# Patient Record
Sex: FEMALE | Race: WHITE | NOT HISPANIC OR LATINO | Employment: FULL TIME | URBAN - METROPOLITAN AREA
[De-identification: names, ages, dates, MRNs, and addresses within clinical notes are randomized per-mention and may not be internally consistent; named-entity substitution may affect disease eponyms.]

---

## 2018-08-31 ENCOUNTER — LAB VISIT (OUTPATIENT)
Dept: LAB | Facility: HOSPITAL | Age: 23
End: 2018-08-31
Attending: UROLOGY
Payer: COMMERCIAL

## 2018-08-31 ENCOUNTER — OFFICE VISIT (OUTPATIENT)
Dept: UROLOGY | Facility: CLINIC | Age: 23
End: 2018-08-31
Payer: COMMERCIAL

## 2018-08-31 VITALS
DIASTOLIC BLOOD PRESSURE: 81 MMHG | SYSTOLIC BLOOD PRESSURE: 125 MMHG | HEIGHT: 64 IN | HEART RATE: 76 BPM | WEIGHT: 102.75 LBS | BODY MASS INDEX: 17.54 KG/M2

## 2018-08-31 DIAGNOSIS — N89.8 VAGINAL DISCHARGE: ICD-10-CM

## 2018-08-31 DIAGNOSIS — R10.2 PELVIC PAIN: ICD-10-CM

## 2018-08-31 DIAGNOSIS — R31.0 GROSS HEMATURIA: Primary | ICD-10-CM

## 2018-08-31 DIAGNOSIS — R31.0 GROSS HEMATURIA: ICD-10-CM

## 2018-08-31 LAB
ALBUMIN SERPL BCP-MCNC: 4.6 G/DL
ALP SERPL-CCNC: 67 U/L
ALT SERPL W/O P-5'-P-CCNC: 11 U/L
ANION GAP SERPL CALC-SCNC: 10 MMOL/L
AST SERPL-CCNC: 15 U/L
B-HCG UR QL: NEGATIVE
BILIRUB SERPL-MCNC: 0.7 MG/DL
BILIRUB SERPL-MCNC: NORMAL MG/DL
BLOOD URINE, POC: NORMAL
BUN SERPL-MCNC: 11 MG/DL
CALCIUM SERPL-MCNC: 9.7 MG/DL
CANDIDA RRNA VAG QL PROBE: NEGATIVE
CHLORIDE SERPL-SCNC: 106 MMOL/L
CO2 SERPL-SCNC: 24 MMOL/L
COLOR, POC UA: NORMAL
CREAT SERPL-MCNC: 0.8 MG/DL
CTP QC/QA: YES
ERYTHROCYTE [DISTWIDTH] IN BLOOD BY AUTOMATED COUNT: 12.4 %
EST. GFR  (AFRICAN AMERICAN): >60 ML/MIN/1.73 M^2
EST. GFR  (NON AFRICAN AMERICAN): >60 ML/MIN/1.73 M^2
G VAGINALIS RRNA GENITAL QL PROBE: NEGATIVE
GLUCOSE SERPL-MCNC: 89 MG/DL
GLUCOSE UR QL STRIP: NORMAL
HCT VFR BLD AUTO: 39.5 %
HGB BLD-MCNC: 13 G/DL
KETONES UR QL STRIP: NORMAL
LEUKOCYTE ESTERASE URINE, POC: NORMAL
MCH RBC QN AUTO: 30.6 PG
MCHC RBC AUTO-ENTMCNC: 32.9 G/DL
MCV RBC AUTO: 93 FL
NITRITE, POC UA: NORMAL
PH, POC UA: 5
PLATELET # BLD AUTO: 239 K/UL
PMV BLD AUTO: 7.1 FL
POTASSIUM SERPL-SCNC: 3.6 MMOL/L
PROT SERPL-MCNC: 7.3 G/DL
PROTEIN, POC: NORMAL
RBC # BLD AUTO: 4.24 M/UL
SODIUM SERPL-SCNC: 140 MMOL/L
SPECIFIC GRAVITY, POC UA: 1.02
T VAGINALIS RRNA GENITAL QL PROBE: NEGATIVE
UROBILINOGEN, POC UA: NORMAL
WBC # BLD AUTO: 8.8 K/UL

## 2018-08-31 PROCEDURE — 81025 URINE PREGNANCY TEST: CPT | Mod: S$GLB,,, | Performed by: UROLOGY

## 2018-08-31 PROCEDURE — 85027 COMPLETE CBC AUTOMATED: CPT

## 2018-08-31 PROCEDURE — 80053 COMPREHEN METABOLIC PANEL: CPT

## 2018-08-31 PROCEDURE — 88112 CYTOPATH CELL ENHANCE TECH: CPT | Performed by: PATHOLOGY

## 2018-08-31 PROCEDURE — 36415 COLL VENOUS BLD VENIPUNCTURE: CPT

## 2018-08-31 PROCEDURE — 87660 TRICHOMONAS VAGIN DIR PROBE: CPT

## 2018-08-31 PROCEDURE — 81002 URINALYSIS NONAUTO W/O SCOPE: CPT | Mod: S$GLB,,, | Performed by: UROLOGY

## 2018-08-31 PROCEDURE — 99999 PR PBB SHADOW E&M-NEW PATIENT-LVL V: CPT | Mod: PBBFAC,,, | Performed by: UROLOGY

## 2018-08-31 PROCEDURE — 99204 OFFICE O/P NEW MOD 45 MIN: CPT | Mod: 25,S$GLB,, | Performed by: UROLOGY

## 2018-08-31 NOTE — H&P (VIEW-ONLY)
[facility] Note - [place]  Staff: MD Teo    Referring provider and please cc: self  PCP: none    [facility]: [facility]    Chief Complaint: gross hematuria    Subjective:        HPI: Josefa Shaver is a 23 y.o. female presents with     Gross hematuria and pain with urination.   -she says she had gross hematuria with clots 1 year ago and again this past month associated with urgency, urge incontinence, burning with urination and no fevers. The hematuria lasted for 4 days and has not had any bleeding since then but has pain persisted. She describes the pain as constant urgency and discomfort.  Definitely not associated with menses, sees gynecologist regularly and last saw her 6 months ago. No flank pain. She went to urgent care and unclear if they sent a culture. She's never had any imaging.   -she started having uti's about 2-3 years ago, sexually active but not associated with intercourse. Birth control was the only change. About 4x a year. Sx usually: dysuria, abdominal pain. No dyspareunia.   No personal or family hx of stones. No issues as a kid.   -today she feels like she has a uti bc it's comfortable when she voids. No vaginal discharge. ua today completely negative. Last abx dose was a month ago.     Of note she has PCOS and had exploratory laporoscopcy for cyst 2 years ago.     Urinalysis void: 1.020/5/remiander neg  Urine history:   Urgent care-[facility]    ECOG Status: 0      REVIEW OF SYSTEMS:  General ROS: no fevers, no chills  Psychological ROS: no depression  Endocrine ROS: no heat or cold  Respiratory ROS: no SOB  Cardiovascular ROS: no CP  Gastrointestinal ROS: no abdominal pain, no constipation, no diarrhea, noBRBPR  Musculoskeletal ROS: no muscle pain  Neurological ROS: no headaches  Dermatological ROS: no rashes  HEENT: noglasses, no sinus   ROS: per HPI     PMHx:  Past Medical History:   Diagnosis Date    Pelvic pain in female     PONV (postoperative nausea  and vomiting)    gross hematuria  Kidney stones: No    PSHx:  Past Surgical History:   Procedure Laterality Date    FRACTURE SURGERY Left 2004     Urologic or Gynecologic Surgery: exploratory laporoscopy    Stents/Valves/Foreign Bodies: No  Cardiac Evaluation: No  Gynecologist:      Screening Studies  Colonoscopy: none    Fam Hx:   malignancies: No , gyn malignancies: Grandmother with breast cancer . Parents alive   kidney stones: No     Soc Hx:  No tobacco.   No alcohol  Lives in [place]  :unmarried  Children: 0  Occupation:works at [employer]    Allergies:  Nalfon [fenoprofen]    Medications: reviewed   Anticoagulation: No    Objective:     Vitals:    08/31/18 0959   BP: 125/81   Pulse: 76       General:WDWN in NAD  Eyes: PERRLA, normal conjunctiva  Respiratory: no increased work on breathing. No wheezing.   Cardiovascular: No obvious extremity edema. Warm and well perfused.   GI: no palpation of masses. No tenderness. No hepatosplenomegaly to palpation.  Musculoskeletal: normal range of motion of bilateral upper extremities. Normal muscle strength and tone.  Skin: no obvious rashes or lesions. No tightening of skin noted.  Neurologic: CN grossly normal. Normal sensation.   Psychiatric: awake, alert and oriented x 3. Mood and affect normal. Cooperative.    Pelvic exam  External Genitalia: normal hair distribution, no lesions  Urethral meatus: normal without prolapse or caruncle  Urethra: without tenderness or mass  Bladder: without fulness or tenderness  Vagina: normal appearing. No discharge or lesions. No  prolapse. +vaginal discharge white sent for affirm  Anus and perineum: appear normal  No abnormal anterior urethra suggesting diverticulm, no discharge with palapation  Levator ani tenderness: none    LABS REVIEW:      Lab Results   Component Value Date    WBC 8.35 10/10/2016    HGB 13.8 10/10/2016    HCT 41.9 10/10/2016    MCV 95 10/10/2016     10/10/2016     BMP  No results  found for: NA, K, CL, CO2, BUN, CREATININE, CALCIUM, ANIONGAP, ESTGFRAFRICA, EGFRNONAA      PATHOLOGY REVIEW:  none    RADIOGRAPHIC REVIEW:  none      Assessment:       1. Gross hematuria    2. Pelvic pain    3. Vaginal discharge          Plan:     This pt had pelvic pain associated with gross hematuria that lasted four days. She showed me a picture and she had bright red urine with clots. This is not a normal presentation of UTI. That being said she has no blood in urine today but still has pain and urgency. I do not think this is vaginal as she denies abnormal periods and it was only associated with voiding.  I suspect she has a stone, however I would like to proceed with a CTU instead of a CTRSS bc of the hematuria. She has no baseline labs and CBC/CMP. I am also sending a urine cytology, although she is low risk, bc of this abnormal presentation.     I also sent a vaginosis screen but do not think this is the cause. Vaginal exam did not reveal any obvious urethral diverticulum. That being said I have her scheduled for a cystoscopy as well to closely evaluate for this.     ctu  This week or next  cmp and cbc  Cystoscopy on sept 24  urinlaysis and culture in lab 1 week prior  Vaginosis screen today- take off speculum   Urine cytology today     Beckie Bruce MD

## 2018-08-31 NOTE — PATIENT INSTRUCTIONS
ctu  This week or next  cmp and cbc today   Cystoscopy on sept 24  urinlaysis and culture in lab 1 week prior  Vaginosis screen today- take off speculum   Urine cytology today

## 2018-08-31 NOTE — PROGRESS NOTES
[facility] - [place]  Staff: MD Teo    Referring provider and please cc: self  PCP: none    [facility]: inactive    Chief Complaint: gross hematuria    Subjective:        HPI: Josefa Shaver is a 23 y.o. female presents with     Gross hematuria and pain with urination.   -she says she had gross hematuria with clots 1 year ago and again this past month associated with urgency, urge incontinence, burning with urination and no fevers. The hematuria lasted for 4 days and has not had any bleeding since then but has pain persisted. She describes the pain as constant urgency and discomfort.  Definitely not associated with menses, sees gynecologist regularly and last saw her 6 months ago. No flank pain. She went to urgent care and unclear if they sent a culture. She's never had any imaging.   -she started having uti's about 2-3 years ago, sexually active but not associated with intercourse. Birth control was the only change. About 4x a year. Sx usually: dysuria, abdominal pain. No dyspareunia.   No personal or family hx of stones. No issues as a kid.   -today she feels like she has a uti bc it's comfortable when she voids. No vaginal discharge. ua today completely negative. Last abx dose was a month ago.     Of note she has PCOS and had exploratory laporoscopcy for cyst 2 years ago.     Urinalysis void: 1.020/5/remiander neg  Urine history:   Urgent care-[facility]    ECOG Status: 0      REVIEW OF SYSTEMS:  General ROS: no fevers, no chills  Psychological ROS: no depression  Endocrine ROS: no heat or cold  Respiratory ROS: no SOB  Cardiovascular ROS: no CP  Gastrointestinal ROS: no abdominal pain, no constipation, no diarrhea, noBRBPR  Musculoskeletal ROS: no muscle pain  Neurological ROS: no headaches  Dermatological ROS: no rashes  HEENT: noglasses, no sinus   ROS: per HPI     PMHx:  Past Medical History:   Diagnosis Date    Pelvic pain in female     PONV (postoperative nausea  and vomiting)    gross hematuria  Kidney stones: No    PSHx:  Past Surgical History:   Procedure Laterality Date    FRACTURE SURGERY Left 2004     Urologic or Gynecologic Surgery: exploratory laporoscopy    Stents/Valves/Foreign Bodies: No  Cardiac Evaluation: No  Gynecologist:      Screening Studies  Colonoscopy: none    Fam Hx:   malignancies: No , gyn malignancies: Grandmother with breast cancer . Parents alive   kidney stones: No     Soc Hx:  No tobacco.   No alcohol  Lives in [place]  :unmarried  Children: 0  Occupation:works at [employer]    Allergies:  Nalfon [fenoprofen]    Medications: reviewed   Anticoagulation: No    Objective:     Vitals:    08/31/18 0959   BP: 125/81   Pulse: 76       General:WDWN in NAD  Eyes: PERRLA, normal conjunctiva  Respiratory: no increased work on breathing. No wheezing.   Cardiovascular: No obvious extremity edema. Warm and well perfused.   GI: no palpation of masses. No tenderness. No hepatosplenomegaly to palpation.  Musculoskeletal: normal range of motion of bilateral upper extremities. Normal muscle strength and tone.  Skin: no obvious rashes or lesions. No tightening of skin noted.  Neurologic: CN grossly normal. Normal sensation.   Psychiatric: awake, alert and oriented x 3. Mood and affect normal. Cooperative.    Pelvic exam  External Genitalia: normal hair distribution, no lesions  Urethral meatus: normal without prolapse or caruncle  Urethra: without tenderness or mass  Bladder: without fulness or tenderness  Vagina: normal appearing. No discharge or lesions. No  prolapse. +vaginal discharge white sent for affirm  Anus and perineum: appear normal  No abnormal anterior urethra suggesting diverticulm, no discharge with palapation  Levator ani tenderness: none    LABS REVIEW:      Lab Results   Component Value Date    WBC 8.35 10/10/2016    HGB 13.8 10/10/2016    HCT 41.9 10/10/2016    MCV 95 10/10/2016     10/10/2016     BMP  No results  found for: NA, K, CL, CO2, BUN, CREATININE, CALCIUM, ANIONGAP, ESTGFRAFRICA, EGFRNONAA      PATHOLOGY REVIEW:  none    RADIOGRAPHIC REVIEW:  none      Assessment:       1. Gross hematuria    2. Pelvic pain    3. Vaginal discharge          Plan:     This pt had pelvic pain associated with gross hematuria that lasted four days. She showed me a picture and she had bright red urine with clots. This is not a normal presentation of UTI. That being said she has no blood in urine today but still has pain and urgency. I do not think this is vaginal as she denies abnormal periods and it was only associated with voiding.  I suspect she has a stone, however I would like to proceed with a CTU instead of a CTRSS bc of the hematuria. She has no baseline labs and CBC/CMP. I am also sending a urine cytology, although she is low risk, bc of this abnormal presentation.     I also sent a vaginosis screen but do not think this is the cause. Vaginal exam did not reveal any obvious urethral diverticulum. That being said I have her scheduled for a cystoscopy as well to closely evaluate for this.     ctu  This week or next  cmp and cbc  Cystoscopy on sept 24  urinlaysis and culture in lab 1 week prior  Vaginosis screen today- take off speculum   Urine cytology today     Beckie Bruce MD

## 2018-09-04 ENCOUNTER — TELEPHONE (OUTPATIENT)
Dept: UROLOGY | Facility: CLINIC | Age: 23
End: 2018-09-04

## 2018-09-04 ENCOUNTER — HOSPITAL ENCOUNTER (OUTPATIENT)
Dept: RADIOLOGY | Facility: HOSPITAL | Age: 23
Discharge: HOME | End: 2018-09-04
Attending: UROLOGY
Payer: COMMERCIAL

## 2018-09-04 DIAGNOSIS — R31.0 GROSS HEMATURIA: ICD-10-CM

## 2018-09-04 PROCEDURE — 74178 CT ABD&PLV WO CNTR FLWD CNTR: CPT | Mod: TC

## 2018-09-04 PROCEDURE — 74178 CT ABD&PLV WO CNTR FLWD CNTR: CPT | Mod: 26,,, | Performed by: RADIOLOGY

## 2018-09-04 PROCEDURE — 25500020 PHARM REV CODE 255

## 2018-09-04 RX ADMIN — IOHEXOL 75 ML: 350 INJECTION, SOLUTION INTRAVENOUS at 09:09

## 2018-09-10 ENCOUNTER — TELEPHONE (OUTPATIENT)
Dept: UROLOGY | Facility: CLINIC | Age: 23
End: 2018-09-10

## 2018-09-10 NOTE — TELEPHONE ENCOUNTER
----- Message from Maria Luisa Jacobsen sent at 9/10/2018  2:50 PM CDT -----  Contact: self  Type:  Patient Returning Call    Who Called:  self  Who Left Message for Patient:  Stefanie  Does the patient know what this is regarding?:  no  Best Call Back Number:  [phone]  Additional Information:  Returning call. Thanks!

## 2018-09-17 ENCOUNTER — LAB VISIT (OUTPATIENT)
Dept: LAB | Facility: HOSPITAL | Age: 23
End: 2018-09-17
Attending: UROLOGY
Payer: COMMERCIAL

## 2018-09-17 DIAGNOSIS — R10.2 PELVIC PAIN: ICD-10-CM

## 2018-09-17 DIAGNOSIS — R31.0 GROSS HEMATURIA: ICD-10-CM

## 2018-09-17 LAB
BILIRUB UR QL STRIP: NEGATIVE
CLARITY UR: CLEAR
COLOR UR: YELLOW
GLUCOSE UR QL STRIP: NEGATIVE
HGB UR QL STRIP: NEGATIVE
KETONES UR QL STRIP: NEGATIVE
LEUKOCYTE ESTERASE UR QL STRIP: NEGATIVE
NITRITE UR QL STRIP: NEGATIVE
PH UR STRIP: 6 [PH] (ref 5–8)
PROT UR QL STRIP: NEGATIVE
SP GR UR STRIP: >=1.03 (ref 1–1.03)
URN SPEC COLLECT METH UR: ABNORMAL
UROBILINOGEN UR STRIP-ACNC: NEGATIVE EU/DL

## 2018-09-17 PROCEDURE — 81003 URINALYSIS AUTO W/O SCOPE: CPT

## 2018-09-17 PROCEDURE — 87086 URINE CULTURE/COLONY COUNT: CPT

## 2018-09-19 LAB
BACTERIA UR CULT: NORMAL
BACTERIA UR CULT: NORMAL

## 2018-09-24 ENCOUNTER — HOSPITAL ENCOUNTER (OUTPATIENT)
Facility: AMBULARY SURGERY CENTER | Age: 23
Discharge: HOME | End: 2018-09-24
Attending: UROLOGY | Admitting: UROLOGY
Payer: COMMERCIAL

## 2018-09-24 DIAGNOSIS — R31.0 GROSS HEMATURIA: ICD-10-CM

## 2018-09-24 DIAGNOSIS — R10.2 FEMALE PELVIC PAIN: Primary | ICD-10-CM

## 2018-09-24 LAB
BACTERIA SPEC CULT: NORMAL
BILIRUB SERPL-MCNC: NORMAL MG/DL
BILIRUB SERPL-MCNC: NORMAL MG/DL
BLOOD URINE, POC: NORMAL
BLOOD URINE, POC: NORMAL
CASTS: NORMAL
COLOR, POC UA: NORMAL
COLOR, POC UA: NORMAL
CRYSTALS: NORMAL
GLUCOSE UR QL STRIP: NORMAL
GLUCOSE UR QL STRIP: NORMAL
KETONES UR QL STRIP: NORMAL
KETONES UR QL STRIP: NORMAL
LEUKOCYTE ESTERASE URINE, POC: NORMAL
LEUKOCYTE ESTERASE URINE, POC: NORMAL
NITRITE, POC UA: NORMAL
NITRITE, POC UA: NORMAL
PH, POC UA: 6
PH, POC UA: 7
PROTEIN, POC: 100
PROTEIN, POC: NORMAL
RBC CELLS COUNTED: NORMAL
SPECIFIC GRAVITY, POC UA: 1.01
SPECIFIC GRAVITY, POC UA: NORMAL
UROBILINOGEN, POC UA: NORMAL
UROBILINOGEN, POC UA: NORMAL
WHITE BLOOD CELLS: NORMAL

## 2018-09-24 PROCEDURE — 57452 EXAM OF CERVIX W/SCOPE: CPT

## 2018-09-24 PROCEDURE — 57452 EXAM OF CERVIX W/SCOPE: CPT | Mod: 51,,, | Performed by: UROLOGY

## 2018-09-24 PROCEDURE — 52000 CYSTOURETHROSCOPY: CPT | Performed by: UROLOGY

## 2018-09-24 PROCEDURE — 52000 CYSTOURETHROSCOPY: CPT | Mod: ,,, | Performed by: UROLOGY

## 2018-09-24 RX ORDER — LIDOCAINE HYDROCHLORIDE 20 MG/ML
JELLY TOPICAL
Status: DISCONTINUED | OUTPATIENT
Start: 2018-09-24 | End: 2018-09-24 | Stop reason: HOSPADM

## 2018-09-24 RX ORDER — LIDOCAINE HYDROCHLORIDE 20 MG/ML
JELLY TOPICAL
Status: DISCONTINUED
Start: 2018-09-24 | End: 2018-09-24 | Stop reason: HOSPADM

## 2018-09-24 RX ORDER — CIPROFLOXACIN 500 MG/1
TABLET ORAL
Status: DISCONTINUED
Start: 2018-09-24 | End: 2018-09-24 | Stop reason: HOSPADM

## 2018-09-24 RX ORDER — WATER 1000 ML/1000ML
INJECTION, SOLUTION INTRAVENOUS
Status: DISCONTINUED | OUTPATIENT
Start: 2018-09-24 | End: 2018-09-24 | Stop reason: HOSPADM

## 2018-09-24 RX ORDER — CIPROFLOXACIN 500 MG/1
500 TABLET ORAL EVERY 12 HOURS
Status: DISCONTINUED | OUTPATIENT
Start: 2018-09-24 | End: 2018-09-24 | Stop reason: HOSPADM

## 2018-09-24 RX ADMIN — CIPROFLOXACIN 500 MG: 500 TABLET ORAL at 03:09

## 2018-09-24 NOTE — OP NOTE
[facility] Procedure Note  Date: 09/24/2018    Procedure:   1. Flexible cysto-uretheroscopy   2. Vaginoscopy with cystoscope    Pre Procedure Diagnosis: gross hematuria, pelvic pain    Post Procedure Diagnosis: same, see below for findings    Surgeon: Beckie Bruce MD    Specimen: none    Anesthesia: 2% uro-jet lidocaine jelly for local analgesia    Indications: Josefa Shaver is a 23 y.o. female  With gross heamturia and pelvic pain. Urine reviewed and cath ua was negative.  H&P reviewed.     Procedure in detail:   Flexible cysto-urethroscopy was performed after consent was obtained.  The risks and benefits were explained.    2% lidocaine urojet was used for local analgesia.  The genitalia was prepped and draped in the sterile fashion with betadine.    The flexible scope was advanced into the urethra and into the bladder.  Bilateral ureteral orifice were evaluated and noted to be normal with clear efflux.  The bladder was completely surveyed in a systematic fashion and the scope was retroflexed.    Cystoscopy findings as below in findings.   No strictures were noted.     Vaginoscopy performed with cystoscope.     The patient tolerated the procedure well without complication.    Findings: (pictures were  uploaded into media)  1. Cystoscopy findings:  She did seem to be having a detrussor contraction with voiding but eventually relaxed. .  2. Vaginal exam findings: no tenderness on her urethra. She says the pain is deep to her clitoris and in her lower abdomen when she has to void. Non tender on her clitoris. Red labia majora but also gets waxed.   3. Other findings: vaginoscopy- showed IUD string. White discharge. Previously sent and vaginosis screen was negative.       Plan:    Follow up with urology NP in 3 months and if no improvement with oab med will refer for pelvic floor physical therapy. Pre-op nurse mentioned bruising on inner thigh? But I did not see. She has only be having symptoms of  pelvic pain for 2 months but does state she ahd gross hematuria 2 years ago. Could be dysfunctional voiding? Ct scan negative. Cystoscopy showed detrusor contraction. +levator tendereness. F/u wth me in 6 months if no improvement in symptoms.   Still if no improvement may need MRI to evaluate for any neurologic conditions like MS.     Take myrbetriq 25mg daily for leakage. Need to take for 4 to 6 weeks to see results  Call us if too expensive. Will try for 3 months and then plan to discontinue even if doing well to see if it helps retrain her bladder. More than likely will need pelvic floor pt.    If she goes to ER for uti or hematuria she needs catheterized urines sent for culture and a vaginal exam done at the time to ensure no vaginal bleeding. If its during weekday she can see if she can f/u with urology.    Her cath urine today was negative and voided +. prevously culture + and ua neg. She needs catheterized urine samples.   If no improvement in vaginal/clitoral pain then see gynecologist   Apply small dab of lidocaine jelly to exterior area that is tender up to twice a day, wash hands before and after     Beckie Bruce MD

## 2018-09-24 NOTE — DISCHARGE SUMMARY
[facility]  Urology  Discharge Note - Short Stay      Patient Name: Josefa Shaver  MRN: [id number]  Discharge Date and Time:  09/24/2018 3:52 PM  Attending Physician: Beckie Bruce,*   Discharging Provider: Beckie Bruce MD  Primary Care Physician: Primary Doctor No    Final Active Diagnoses:    Diagnosis Date Noted POA    Gross hematuria [R31.0] 09/24/2018 Yes      Problems Resolved During this Admission:       Final Diagnoses: Same as principal problem.    Hospital Course: Patient was admitted for an outpatient procedure and tolerated the procedure well with no complications.*    Procedure(s) (LRB):  CYSTOSCOPY (N/A)  VAGINOSCOPY     Indwelling Lines/Drains at time of discharge:   Lines/Drains/Airways          None          Discharged Condition: good    Disposition: home    Follow Up:      Patient Instructions:   No discharge procedures on file.    Medications:  Reconciled Home Medications:      Medication List      START taking these medications    mirabegron 25 mg Tb24 ER tablet  Commonly known as:  MYRBETRIQ  Take 1 tablet (25 mg total) by mouth once daily.        CONTINUE taking these medications    ibuprofen 800 MG tablet  Commonly known as:  ADVIL,MOTRIN  Take 1 tablet (800 mg total) by mouth 3 (three) times daily.            Discharge Procedure Orders (must include Diet, Follow-up, Activity):  No discharge procedures on file.         Beckie Bruce MD  Urology  [facility]

## 2018-09-24 NOTE — DISCHARGE INSTRUCTIONS
Follow up with urology NP in 3 months and if no improvement with oab med will refer for pelvic floor physical therapy and stil if no improvement may need MRI to evaluate for any neurologic conditions like MS.   Take myrbetriq 25mg daily for leakage. Need to take for 4 to 6 weeks to see results  Call us if too expensive  If she goes to ER for uti or hematuria she needs catheterized urines sent for culture and a vaginal exam done at the time to ensure no vaginal bleeding. If its during weekday she can see if she can f/u with urology  If no improvement in vaginal/clitoral pain then see gynecologist   Apply small dab of lidocaine jelly to exterior area that is tender up to twice a day, wash hands before and after     Cystoscopy    Cystoscopy is a procedure that lets your doctor look directly inside your urethra and bladder. It can be used to:  · Help diagnose a problem with your urethra, bladder, or kidneys.  · Take a sample (biopsy) of bladder or urethral tissue.  · Treat certain problems (such as removing kidney stones).  · Place a stent to bypass an obstruction.  · Take special X-rays of the kidneys.  Based on the findings, your doctor may recommend other tests or treatments.  What is a cystoscope?  A cystoscope is a telescope-like instrument that contains lenses and fiberoptics (small glass wires that make bright light). The cystoscope may be straight and rigid, or flexible to bend around curves in the urethra. The doctor may look directly into the cystoscope, or project the image onto a monitor.  Getting ready  · Ask your doctor if you should stop taking any medicines before the procedure.  · Ask whether you should avoid eating or drinking anything after midnight before the procedure.  · Follow any other instructions your doctor gives you.  Tell your doctor before the exam if you:  · Take any medicines, such as aspirin or blood thinners  · Have allergies to any medicines  · Are pregnant   The  procedure  Cystoscopy is done in the doctors office, surgery center, or hospital. The doctor and a nurse are present during the procedure. It takes only a few minutes, longer if a biopsy, X-ray, or treatment needs to be done.  During the procedure:  · You lie on an exam table on your back, knees bent and legs apart. You are covered with a drape.  · Your urethra and the area around it are washed. Anesthetic jelly may be applied to numb the urethra. Other pain medicine is usually not needed. In some cases, you may be offered a mild sedative to help you relax. If a more extensive procedure is to be done, such as a biopsy or kidney stone removal, general anesthesia may be needed.  · The cystoscope is inserted. A sterile fluid is put into the bladder to expand it. You may feel pressure from this fluid.  · When the procedure is done, the cystoscope is removed.  After the procedure  If you had a sedative, general anesthesia, or spinal anesthesia, you must have someone drive you home. Once youre home:  · Drink plenty of fluids.  · You may have burning or light bleeding when you urinate--this is normal.  · Medicines may be prescribed to ease any discomfort or prevent infection. Take these as directed.  · Call your doctor if you have heavy bleeding or blood clots, burning that lasts more than a day, a fever over 100°F  (38° C), or trouble urinating.  Date Last Reviewed: 1/1/2017  © 7331-5570 [employer]. [address] [place] [address], [place] [address]. All rights reserved. This information is not intended as a substitute for professional medical care. Always follow your healthcare professional's instructions.

## 2018-09-24 NOTE — INTERVAL H&P NOTE
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  No stones in ureters  Ureters opacify proximally without any filling defects  Bladder normal  iud in uterus    URINE, VOIDED 8/318/18:  -Negative; many benign urothelial cells and benign squamous cells.  -No dysplastic or malignant cells are present.    Urinalysis void: tr leuk, cath:   Urine history:   9/17/18 Multiple org, void: neg  8/31/18 No cx, void: neg, vaginosis screen negative  Urgent care-[facility]    Anti-coagulation: none        Lab Results   Component Value Date    WBC 8.80 08/31/2018    HGB 13.0 08/31/2018    HCT 39.5 08/31/2018    MCV 93 08/31/2018     08/31/2018     BMP  Lab Results   Component Value Date     08/31/2018    K 3.6 08/31/2018     08/31/2018    CO2 24 08/31/2018    BUN 11 08/31/2018    CREATININE 0.8 08/31/2018    CALCIUM 9.7 08/31/2018    ANIONGAP 10 08/31/2018    ESTGFRAFRICA >60 08/31/2018    EGFRNONAA >60 08/31/2018         This patient has been cleared for surgery in ambulatory surgical facility.

## 2018-09-25 VITALS
SYSTOLIC BLOOD PRESSURE: 146 MMHG | OXYGEN SATURATION: 100 % | TEMPERATURE: 99 F | DIASTOLIC BLOOD PRESSURE: 89 MMHG | HEART RATE: 96 BPM | BODY MASS INDEX: 17.54 KG/M2 | WEIGHT: 102.75 LBS | HEIGHT: 64 IN | RESPIRATION RATE: 18 BRPM

## 2022-01-10 ENCOUNTER — HOSPITAL ENCOUNTER (EMERGENCY)
Facility: HOSPITAL | Age: 27
Discharge: LEFT BEFORE DISCHARGE | End: 2022-01-10
Attending: EMERGENCY MEDICINE
Payer: COMMERCIAL

## 2022-01-10 VITALS
HEART RATE: 68 BPM | WEIGHT: 112 LBS | OXYGEN SATURATION: 99 % | SYSTOLIC BLOOD PRESSURE: 158 MMHG | HEIGHT: 64 IN | DIASTOLIC BLOOD PRESSURE: 88 MMHG | BODY MASS INDEX: 19.12 KG/M2 | TEMPERATURE: 98 F | RESPIRATION RATE: 18 BRPM

## 2022-01-10 DIAGNOSIS — Z53.21 ELOPED FROM EMERGENCY DEPARTMENT: Primary | ICD-10-CM

## 2022-01-10 DIAGNOSIS — U07.1 COVID-19: ICD-10-CM

## 2022-01-10 DIAGNOSIS — R10.9 FLANK PAIN: ICD-10-CM

## 2022-01-10 LAB
ALBUMIN SERPL BCP-MCNC: 4.6 G/DL (ref 3.5–5.2)
ALP SERPL-CCNC: 65 U/L (ref 55–135)
ALT SERPL W/O P-5'-P-CCNC: 15 U/L (ref 10–44)
ANION GAP SERPL CALC-SCNC: 13 MMOL/L (ref 8–16)
AST SERPL-CCNC: 19 U/L (ref 10–40)
B-HCG UR QL: NEGATIVE
BASOPHILS # BLD AUTO: 0.01 K/UL (ref 0–0.2)
BASOPHILS NFR BLD: 0.1 % (ref 0–1.9)
BILIRUB SERPL-MCNC: 0.6 MG/DL (ref 0.1–1)
BILIRUB UR QL STRIP: NEGATIVE
BUN SERPL-MCNC: 12 MG/DL (ref 6–20)
CALCIUM SERPL-MCNC: 9.5 MG/DL (ref 8.7–10.5)
CHLORIDE SERPL-SCNC: 101 MMOL/L (ref 95–110)
CK SERPL-CCNC: 51 U/L (ref 20–180)
CLARITY UR: CLEAR
CO2 SERPL-SCNC: 24 MMOL/L (ref 23–29)
COLOR UR: YELLOW
CREAT SERPL-MCNC: 0.8 MG/DL (ref 0.5–1.4)
CRP SERPL-MCNC: 0.23 MG/DL
CTP QC/QA: YES
D DIMER PPP IA.FEU-MCNC: <0.27 UG/ML FEU
DIFFERENTIAL METHOD: NORMAL
EOSINOPHIL # BLD AUTO: 0 K/UL (ref 0–0.5)
EOSINOPHIL NFR BLD: 0.4 % (ref 0–8)
ERYTHROCYTE [DISTWIDTH] IN BLOOD BY AUTOMATED COUNT: 11.9 % (ref 11.5–14.5)
EST. GFR  (AFRICAN AMERICAN): >60 ML/MIN/1.73 M^2
EST. GFR  (NON AFRICAN AMERICAN): >60 ML/MIN/1.73 M^2
FERRITIN SERPL-MCNC: 124 NG/ML (ref 20–300)
GLUCOSE SERPL-MCNC: 114 MG/DL (ref 70–110)
GLUCOSE UR QL STRIP: NEGATIVE
HCT VFR BLD AUTO: 40.3 % (ref 37–48.5)
HGB BLD-MCNC: 13.5 G/DL (ref 12–16)
HGB UR QL STRIP: NEGATIVE
IMM GRANULOCYTES # BLD AUTO: 0.03 K/UL (ref 0–0.04)
IMM GRANULOCYTES NFR BLD AUTO: 0.3 % (ref 0–0.5)
KETONES UR QL STRIP: NEGATIVE
LACTATE SERPL-SCNC: 1.2 MMOL/L (ref 0.5–1.9)
LDH SERPL L TO P-CCNC: 127 U/L (ref 110–260)
LEUKOCYTE ESTERASE UR QL STRIP: NEGATIVE
LYMPHOCYTES # BLD AUTO: 2.2 K/UL (ref 1–4.8)
LYMPHOCYTES NFR BLD: 23.9 % (ref 18–48)
MCH RBC QN AUTO: 30.6 PG (ref 27–31)
MCHC RBC AUTO-ENTMCNC: 33.5 G/DL (ref 32–36)
MCV RBC AUTO: 91 FL (ref 82–98)
MONOCYTES # BLD AUTO: 0.6 K/UL (ref 0.3–1)
MONOCYTES NFR BLD: 6.9 % (ref 4–15)
NEUTROPHILS # BLD AUTO: 6.3 K/UL (ref 1.8–7.7)
NEUTROPHILS NFR BLD: 68.4 % (ref 38–73)
NITRITE UR QL STRIP: NEGATIVE
NRBC BLD-RTO: 0 /100 WBC
PH UR STRIP: 6 [PH] (ref 5–8)
PLATELET # BLD AUTO: 252 K/UL (ref 150–450)
PMV BLD AUTO: 9.3 FL (ref 9.2–12.9)
POTASSIUM SERPL-SCNC: 3.5 MMOL/L (ref 3.5–5.1)
PROCALCITONIN SERPL IA-MCNC: <0.05 NG/ML (ref 0–0.5)
PROT SERPL-MCNC: 7.7 G/DL (ref 6–8.4)
PROT UR QL STRIP: ABNORMAL
RBC # BLD AUTO: 4.41 M/UL (ref 4–5.4)
SODIUM SERPL-SCNC: 138 MMOL/L (ref 136–145)
SP GR UR STRIP: 1.03 (ref 1–1.03)
TROPONIN I SERPL DL<=0.01 NG/ML-MCNC: <0.03 NG/ML
URN SPEC COLLECT METH UR: ABNORMAL
UROBILINOGEN UR STRIP-ACNC: NEGATIVE EU/DL
WBC # BLD AUTO: 9.28 K/UL (ref 3.9–12.7)

## 2022-01-10 PROCEDURE — 83605 ASSAY OF LACTIC ACID: CPT | Performed by: EMERGENCY MEDICINE

## 2022-01-10 PROCEDURE — 85379 FIBRIN DEGRADATION QUANT: CPT | Performed by: EMERGENCY MEDICINE

## 2022-01-10 PROCEDURE — 84484 ASSAY OF TROPONIN QUANT: CPT | Performed by: EMERGENCY MEDICINE

## 2022-01-10 PROCEDURE — 81025 URINE PREGNANCY TEST: CPT | Performed by: EMERGENCY MEDICINE

## 2022-01-10 PROCEDURE — 85025 COMPLETE CBC W/AUTO DIFF WBC: CPT | Performed by: EMERGENCY MEDICINE

## 2022-01-10 PROCEDURE — 82550 ASSAY OF CK (CPK): CPT | Performed by: EMERGENCY MEDICINE

## 2022-01-10 PROCEDURE — 80053 COMPREHEN METABOLIC PANEL: CPT | Performed by: EMERGENCY MEDICINE

## 2022-01-10 PROCEDURE — 82728 ASSAY OF FERRITIN: CPT | Performed by: EMERGENCY MEDICINE

## 2022-01-10 PROCEDURE — 83615 LACTATE (LD) (LDH) ENZYME: CPT | Performed by: EMERGENCY MEDICINE

## 2022-01-10 PROCEDURE — 84145 PROCALCITONIN (PCT): CPT | Performed by: EMERGENCY MEDICINE

## 2022-01-10 PROCEDURE — 81003 URINALYSIS AUTO W/O SCOPE: CPT | Performed by: EMERGENCY MEDICINE

## 2022-01-10 PROCEDURE — 99284 EMERGENCY DEPT VISIT MOD MDM: CPT | Mod: 25

## 2022-01-10 PROCEDURE — 86140 C-REACTIVE PROTEIN: CPT | Performed by: EMERGENCY MEDICINE

## 2022-01-11 NOTE — ED PROVIDER NOTES
Encounter Date: 1/10/2022       History     Chief Complaint   Patient presents with    Flank Pain     R sided flank pain since 1600 today, burning with urination      26-year-old well-appearing female presents emergency department reports that she tested positive for COVID on January 4th.  She states that she has had no symptoms since January 5th she reports approximately 4 days ago she started with some mild dysuria, frequency and noted some blood in her urine.  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  Patient also reports that her back hurts with any type of movement, or bending.  She denies associated chest pain or shortness of breath she denies pleuritic pain however she does take birth control.        Review of patient's allergies indicates:   Allergen Reactions    Nalfon [fenoprofen] Other (See Comments)     Blurred vision     Past Medical History:   Diagnosis Date    Pelvic pain in female     PONV (postoperative nausea and vomiting)      Past Surgical History:   Procedure Laterality Date    CYSTOSCOPY N/A 9/24/2018    Procedure: CYSTOSCOPY;  Surgeon: Beckie Bruce MD;  Location: [facility] to [facility] OR;  Service: Urology;  Laterality: N/A;    FRACTURE SURGERY Left 2004    VAGINOSCOPY  9/24/2018    Procedure: VAGINOSCOPY;  Surgeon: Beckie Bruce MD;  Location: [facility] to [facility] OR;  Service: Urology;;     Family History   Problem Relation Age of Onset    Diabetes Mother     Hypertension Mother     Depression Mother     Transient ischemic attack Mother     Hyperlipidemia Father      Social History     Tobacco Use    Smoking status: Never Smoker   Substance Use Topics    Alcohol use: No    Drug use: No     Review of Systems   Constitutional: Positive for chills and fever.   HENT: Negative.     Respiratory: Negative.    Cardiovascular: Negative.    Gastrointestinal: Negative.    Genitourinary: Positive for dysuria, flank pain and frequency.   Musculoskeletal: Positive for back pain.   Skin: Negative.    Neurological: Negative.    Hematological: Negative.    Psychiatric/Behavioral: Negative.        Physical Exam     Initial Vitals [01/10/22 1800]   BP Pulse Resp Temp SpO2   (!) 158/88 68 18 97.7 °F (36.5 °C) 99 %      MAP       --         Physical Exam    Nursing note and vitals reviewed.  Constitutional: She appears well-developed and well-nourished.   HENT:   Head: Normocephalic and atraumatic.   Eyes: EOM are normal. Pupils are equal, round, and reactive to light.   Cardiovascular: Normal rate, regular rhythm and normal heart sounds.   Pulmonary/Chest: Breath sounds normal. No respiratory distress. She has no wheezes. She exhibits no tenderness.   Abdominal: Abdomen is soft. Bowel sounds are normal.   Musculoskeletal:         General: Normal range of motion.     Neurological: She is alert and oriented to person, place, and time.   Skin: Skin is dry. No rash noted.   Psychiatric: She has a normal mood and affect.         ED Course   Procedures  Labs Reviewed   COMPREHENSIVE METABOLIC PANEL - Abnormal; Notable for the following components:       Result Value    Glucose 114 (*)     All other components within normal limits   URINALYSIS, REFLEX TO URINE CULTURE - Abnormal; Notable for the following components:    Protein, UA Trace (*)     All other components within normal limits    Narrative:     Specimen Source->Urine   POCT URINE PREGNANCY - Normal   CBC W/ AUTO DIFFERENTIAL   C-REACTIVE PROTEIN   FERRITIN   LACTATE DEHYDROGENASE   CK   LACTIC ACID, PLASMA   TROPONIN I   PROCALCITONIN   D DIMER, QUANTITATIVE          Imaging Results          X-Ray Chest AP Portable (Final result)  Result time 01/10/22 19:02:51    Final result by Sarah Gama MD (01/10/22 19:02:51)                 Narrative:    XR  CHEST 1 VIEW    CLINICAL HISTORY:  26 years Female COVID-19    COMPARISON: None    FINDINGS: Cardiomediastinal silhouette is within normal limits. Lungs are normally expanded with no airspace consolidation. No pleural effusion or pneumothorax. No acute osseous abnormality.    IMPRESSION: No acute pulmonary process.    Electronically signed by:  Sarah Gama MD  1/10/2022 7:02 PM CST Workstation: [id number]                               Medications - No data to display  Medical Decision Making:   Initial Assessment:   26-year-old well-appearing female presents emergency department reports that she tested positive for COVID on January 4th.  She states that she has had no symptoms since January 5th she reports approximately 4 days ago she started with some mild dysuria, frequency and noted some blood in her urine.  Patient states that she works at a [employer] she has been taking amoxicillin she reports that the blood in her urine has resolved however she still is complaining of some burning with urination and now she has some pain to her right flank area.  Patient also reports that her back hurts with any type of movement, or bending.  She denies associated chest pain or shortness of breath she denies pleuritic pain however she does take birth control.        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 and started taking amoxicillin she states that her blood in her urine resolved however she continues to have some burning with urination and frequency today she developed some pain to her right flank area and she has had a previous kidney infections if she became concerned the patient had no nausea vomiting or fevers she denies any chest pain or shortness of breath she is complaining of pain to the right flank or right lower  thoracic area however denies pain with deep inspiration the patient is at risk for PE secondary to recent COVID, and on birth control pills her D-dimer was normal patient's labs are reassuring she has no evidence of a UTI therefore pyelo unlikely patient also has no evidence of leukocytosis or fevers.  Given patient's symptoms and still complaining of pain CT imaging without contrast ordered of the pelvis and abdomen to assess for ureterolithiasis CTA of the chest ordered to exclude PE.  The patient however eloped the emergency department before receiving any further CT imaging the patient was aware that the test were ordered and why they ordered.             ED Course as of 01/10/22 2336   Mon Elkin 10, 2022   1900 Still waiting for urine to be sent [MP]   2115 Patient's labs are reassuring chest x-ray is normal still complaining of pain to the right flank area.  His CT scan without contrast will be ordered patient has had recent COVID and on birth control D-dimer normal, however PE in the differential CTA will be performed [MP]      ED Course User Index  [MP] BALTA Berry             Clinical Impression:   Final diagnoses:  [U07.1] COVID-19  [Z53.21] Eloped from emergency department (Primary)  [R10.9] Flank pain          ED Disposition Condition    Eloped               BALTA Berry  01/10/22 7824

## 2022-03-03 NOTE — TELEPHONE ENCOUNTER
----- Message from Denise Dominguez sent at 9/4/2018  2:56 PM CDT -----  Call [phone]  Returning call for Stefanie   
See result note  
English

## 2022-12-21 ENCOUNTER — TELEPHONE (OUTPATIENT)
Dept: FAMILY MEDICINE | Facility: CLINIC | Age: 27
End: 2022-12-21

## 2022-12-21 ENCOUNTER — CLINICAL SUPPORT (OUTPATIENT)
Dept: FAMILY MEDICINE | Facility: CLINIC | Age: 27
End: 2022-12-21

## 2022-12-21 VITALS — DIASTOLIC BLOOD PRESSURE: 74 MMHG | SYSTOLIC BLOOD PRESSURE: 106 MMHG

## 2022-12-21 DIAGNOSIS — Z01.30 ENCOUNTER FOR BLOOD PRESSURE EXAMINATION: Primary | ICD-10-CM

## 2022-12-21 PROCEDURE — 99999 PR PBB SHADOW E&M-EST. PATIENT-LVL I: ICD-10-PCS | Mod: PBBFAC,,,

## 2022-12-21 PROCEDURE — 99999 PR PBB SHADOW E&M-EST. PATIENT-LVL I: CPT | Mod: PBBFAC,,,

## 2022-12-21 PROCEDURE — 99211 OFF/OP EST MAY X REQ PHY/QHP: CPT | Mod: PBBFAC,PN

## 2022-12-21 NOTE — TELEPHONE ENCOUNTER
----- Message from Dayan Washington sent at 12/21/2022  8:24 AM CST -----  Contact: [phone]  Type:  Needs Medical Advice    Who Called: Pt  Would the patient rather a call back or a response via [facility]? call  Best Call Back Number: [phone]   Additional Information: Pt wants to Establish care and get her Blood Pressure Checked. Please call back to advise.

## 2022-12-21 NOTE — PROGRESS NOTES
Josefa CONSTANTINE Shaver 27 y.o. female is here today for Blood Pressure check.   History of HTN no.    Review of patient's allergies indicates:   Allergen Reactions    Nalfon [fenoprofen] Other (See Comments)     Blurred vision     Creatinine   Date Value Ref Range Status   01/10/2022 0.8 0.5 - 1.4 mg/dL Final     Sodium   Date Value Ref Range Status   01/10/2022 138 136 - 145 mmol/L Final     Potassium   Date Value Ref Range Status   01/10/2022 3.5 3.5 - 5.1 mmol/L Final   ]  Patient denies taking blood pressure medications on a regular basis at the same time of the day.     Current Outpatient Medications:     ibuprofen (ADVIL,MOTRIN) 800 MG tablet, Take 1 tablet (800 mg total) by mouth 3 (three) times daily., Disp: 20 tablet, Rfl: 1    mirabegron (MYRBETRIQ) 25 mg Tb24 ER tablet, Take 1 tablet (25 mg total) by mouth once daily., Disp: 30 tablet, Rfl: 2  Does patient have record of home blood pressure readings no. Readings have been averaging NA.   Last dose of blood pressure medication was taken at NA.  Patient is asymptomatic.   Complains of NA.    BP: 106/74 ,   .    Blood pressure reading after 15 minutes was /, Pulse .    notified.

## 2024-12-21 ENCOUNTER — OFFICE VISIT (OUTPATIENT)
Dept: URGENT CARE | Facility: CLINIC | Age: 29
End: 2024-12-21

## 2024-12-21 VITALS
TEMPERATURE: 98 F | HEIGHT: 64 IN | BODY MASS INDEX: 17.93 KG/M2 | DIASTOLIC BLOOD PRESSURE: 91 MMHG | SYSTOLIC BLOOD PRESSURE: 143 MMHG | RESPIRATION RATE: 18 BRPM | HEART RATE: 75 BPM | OXYGEN SATURATION: 98 % | WEIGHT: 105 LBS

## 2024-12-21 DIAGNOSIS — R30.0 DYSURIA: ICD-10-CM

## 2024-12-21 DIAGNOSIS — R39.9 UTI SYMPTOMS: Primary | ICD-10-CM

## 2024-12-21 LAB
BILIRUB UR QL STRIP: POSITIVE
GLUCOSE UR QL STRIP: NEGATIVE
KETONES UR QL STRIP: POSITIVE
LEUKOCYTE ESTERASE UR QL STRIP: POSITIVE
PH, POC UA: 6
POC BLOOD, URINE: NEGATIVE
POC NITRATES, URINE: POSITIVE
PROT UR QL STRIP: POSITIVE
SP GR UR STRIP: 1.02 (ref 1–1.03)
UROBILINOGEN UR STRIP-ACNC: NORMAL (ref 0.1–1.1)

## 2024-12-21 PROCEDURE — 81003 URINALYSIS AUTO W/O SCOPE: CPT | Mod: QW,S$GLB,,

## 2024-12-21 PROCEDURE — 99214 OFFICE O/P EST MOD 30 MIN: CPT | Mod: TIER,S$GLB,,

## 2024-12-21 RX ORDER — CEPHALEXIN 500 MG/1
500 CAPSULE ORAL EVERY 12 HOURS
Qty: 10 CAPSULE | Refills: 0 | Status: SHIPPED | OUTPATIENT
Start: 2024-12-21 | End: 2024-12-26

## 2024-12-21 RX ORDER — PHENAZOPYRIDINE HYDROCHLORIDE 100 MG/1
100 TABLET, FILM COATED ORAL 3 TIMES DAILY PRN
Qty: 9 TABLET | Refills: 0 | Status: SHIPPED | OUTPATIENT
Start: 2024-12-21 | End: 2024-12-24

## 2024-12-21 NOTE — PROGRESS NOTES
"Subjective:      Patient ID: Josefa Shaver is a 29 y.o. female.    Vitals:  height is 5' 4" (1.626 m) and weight is 47.6 kg (105 lb). Her oral temperature is 98.3 °F (36.8 °C). Her blood pressure is 143/91 (abnormal) and her pulse is 75. Her respiration is 18 and oxygen saturation is 98%.     Chief Complaint: Dysuria    Pelvic pain, hematuria with clots, urgency, and frequency for a week.  She has had these symptoms chronically, but does get exacerbated.  At 1 point she was diagnosed with interstitial cystitis.  Seen a urologist, had a cystoscope and stated did not have interstitial cystitis.  Denies history of nephrolithiasis.  OTC treatments have been ineffective.  Denies complaints of STIs.    Dysuria   This is a recurrent problem. Episode onset: x 1 week. The problem occurs every urination. The problem has been unchanged. The quality of the pain is described as burning. The pain is at a severity of 2/10. The pain is mild. There has been no fever. Associated symptoms include frequency and urgency. Treatments tried: AZO. The treatment provided no relief. Her past medical history is significant for recurrent UTIs and a urological procedure. There is no history of diabetes insipidus, diabetes mellitus, a single kidney or STD.       HENT: Negative.     Neck: neck negative. Negative for bulging disc disease.   Cardiovascular: Negative.    Eyes: Negative.    Respiratory: Negative.     Gastrointestinal: Negative.    Endocrine: negative.   Genitourinary:  Positive for dysuria, frequency, urgency, irregular menstruation and pelvic pain. Negative for history of kidney stones and painful menstruation.        PCOS   Musculoskeletal: Negative.    Skin: Negative.    Allergic/Immunologic: Positive for immunizations up-to-date.   Hematologic/Lymphatic: Negative.    Psychiatric/Behavioral: Negative.        Objective:     Physical Exam   Constitutional: She is oriented to person, place, and time. She is cooperative. She does not " appear ill. No distress.   HENT:   Head: Normocephalic and atraumatic.   Ears:   Right Ear: External ear normal.   Left Ear: External ear normal.   Nose: Nose normal.   Mouth/Throat: Uvula is midline, oropharynx is clear and moist and mucous membranes are normal. Mucous membranes are moist. Oropharynx is clear.   Eyes: Conjunctivae and lids are normal. Pupils are equal, round, and reactive to light. Extraocular movement intact   Neck: Trachea normal and phonation normal. Neck supple.   Cardiovascular: Normal rate, regular rhythm, normal heart sounds and normal pulses.   Pulmonary/Chest: Effort normal and breath sounds normal.   Abdominal: Normal appearance. flat abdomen There is abdominal tenderness in the suprapubic area. There is no left CVA tenderness and no right CVA tenderness.   Neurological: no focal deficit. She is alert, oriented to person, place, and time and at baseline. She has normal motor skills, normal sensation and intact cranial nerves (2-12). Gait and coordination normal. GCS eye subscore is 4. GCS verbal subscore is 5. GCS motor subscore is 6.   Skin: Skin is warm and dry. Capillary refill takes 2 to 3 seconds.   Psychiatric: She experiences Normal attention and Normal perception. Her speech is normal and behavior is normal. Mood, judgment and thought content normal.   Nursing note and vitals reviewed.      Assessment:     1. UTI symptoms    2. Dysuria        Plan:       UTI symptoms  -     cephALEXin (KEFLEX) 500 MG capsule; Take 1 capsule (500 mg total) by mouth every 12 (twelve) hours. for 5 days  Dispense: 10 capsule; Refill: 0  -     phenazopyridine (PYRIDIUM) 100 MG tablet; Take 1 tablet (100 mg total) by mouth 3 (three) times daily as needed for Pain.  Dispense: 9 tablet; Refill: 0    Dysuria  -     POCT Urinalysis, Dipstick, Automated, W/O Scope           Urine appears grossly infectious, however likely contaminated from azo.  She has a diffuse history that was reviewed in chart.  Patient  was given information for Dr. Abarca referral.  Previous culture on file contaminated.  Shared medical decision-making declined culture at this time.

## (undated) DEVICE — APRON DISPOSP PLASTIC 24INX42

## (undated) DEVICE — WATER STERILE INJ 500ML BAG

## (undated) DEVICE — JELLY LUBRICANT STERILE 4 OZ

## (undated) DEVICE — SEE MEDLINE ITEM 152651

## (undated) DEVICE — SET CYSTO IRRIGATION UNIV SPIK

## (undated) DEVICE — SHEET DRAPE MEDIUM

## (undated) DEVICE — CONTAINER SPECIMEN STRL 4OZ

## (undated) DEVICE — UNDERGLOVES BIOGEL PI SZ 6 LF

## (undated) DEVICE — SOLN BETADINE SWAB AIDS